# Patient Record
Sex: MALE | Race: WHITE | Employment: OTHER | URBAN - METROPOLITAN AREA
[De-identification: names, ages, dates, MRNs, and addresses within clinical notes are randomized per-mention and may not be internally consistent; named-entity substitution may affect disease eponyms.]

---

## 2017-02-11 ENCOUNTER — OFFICE VISIT (OUTPATIENT)
Dept: FAMILY MEDICINE CLINIC | Age: 32
End: 2017-02-11

## 2017-02-11 VITALS
WEIGHT: 216 LBS | TEMPERATURE: 97 F | DIASTOLIC BLOOD PRESSURE: 80 MMHG | HEIGHT: 69 IN | RESPIRATION RATE: 16 BRPM | SYSTOLIC BLOOD PRESSURE: 128 MMHG | HEART RATE: 60 BPM | BODY MASS INDEX: 31.99 KG/M2

## 2017-02-11 DIAGNOSIS — J01.00 ACUTE NON-RECURRENT MAXILLARY SINUSITIS: ICD-10-CM

## 2017-02-11 DIAGNOSIS — R52 BODY ACHES: Primary | ICD-10-CM

## 2017-02-11 DIAGNOSIS — B34.9 VIRAL SYNDROME: ICD-10-CM

## 2017-02-11 LAB
INFLUENZA A ANTIBODY: NEGATIVE
INFLUENZA B ANTIBODY: NEGATIVE

## 2017-02-11 PROCEDURE — 87804 INFLUENZA ASSAY W/OPTIC: CPT | Performed by: FAMILY MEDICINE

## 2017-02-11 PROCEDURE — 99213 OFFICE O/P EST LOW 20 MIN: CPT | Performed by: FAMILY MEDICINE

## 2017-02-11 RX ORDER — AZITHROMYCIN 250 MG/1
TABLET, FILM COATED ORAL
Qty: 1 PACKET | Refills: 0 | Status: SHIPPED | OUTPATIENT
Start: 2017-02-11 | End: 2017-02-21

## 2017-02-11 ASSESSMENT — ENCOUNTER SYMPTOMS
TROUBLE SWALLOWING: 0
SORE THROAT: 1
SINUS PAIN: 1
COUGH: 1
DIARRHEA: 1
RHINORRHEA: 1
SHORTNESS OF BREATH: 0
SINUS PRESSURE: 1
ABDOMINAL PAIN: 1
NAUSEA: 0
EYES NEGATIVE: 1
VOICE CHANGE: 1
CONSTIPATION: 0
VOMITING: 1
WHEEZING: 0

## 2017-06-02 ENCOUNTER — TELEPHONE (OUTPATIENT)
Dept: FAMILY MEDICINE CLINIC | Age: 32
End: 2017-06-02

## 2017-06-02 RX ORDER — PROPRANOLOL HYDROCHLORIDE 80 MG/1
CAPSULE, EXTENDED RELEASE ORAL
Qty: 30 CAPSULE | Refills: 5 | Status: SHIPPED | OUTPATIENT
Start: 2017-06-02 | End: 2018-05-07 | Stop reason: SDUPTHER

## 2018-05-07 RX ORDER — PROPRANOLOL HYDROCHLORIDE 80 MG/1
CAPSULE, EXTENDED RELEASE ORAL
Qty: 30 CAPSULE | Refills: 5 | Status: SHIPPED | OUTPATIENT
Start: 2018-05-07

## 2018-07-16 ENCOUNTER — OFFICE VISIT (OUTPATIENT)
Dept: FAMILY MEDICINE CLINIC | Age: 33
End: 2018-07-16
Payer: COMMERCIAL

## 2018-07-16 VITALS
SYSTOLIC BLOOD PRESSURE: 118 MMHG | TEMPERATURE: 97.5 F | DIASTOLIC BLOOD PRESSURE: 72 MMHG | BODY MASS INDEX: 30.81 KG/M2 | HEART RATE: 56 BPM | HEIGHT: 69 IN | WEIGHT: 208 LBS | RESPIRATION RATE: 20 BRPM

## 2018-07-16 DIAGNOSIS — M25.462 KNEE EFFUSION, LEFT: ICD-10-CM

## 2018-07-16 DIAGNOSIS — R26.89 LIMPING: ICD-10-CM

## 2018-07-16 DIAGNOSIS — S83.412A SPRAIN OF MEDIAL COLLATERAL LIGAMENT OF LEFT KNEE, INITIAL ENCOUNTER: ICD-10-CM

## 2018-07-16 DIAGNOSIS — M25.562 LEFT KNEE PAIN, UNSPECIFIED CHRONICITY: Primary | ICD-10-CM

## 2018-07-16 PROCEDURE — G8417 CALC BMI ABV UP PARAM F/U: HCPCS | Performed by: FAMILY MEDICINE

## 2018-07-16 PROCEDURE — 1036F TOBACCO NON-USER: CPT | Performed by: FAMILY MEDICINE

## 2018-07-16 PROCEDURE — G8427 DOCREV CUR MEDS BY ELIG CLIN: HCPCS | Performed by: FAMILY MEDICINE

## 2018-07-16 PROCEDURE — 99213 OFFICE O/P EST LOW 20 MIN: CPT | Performed by: FAMILY MEDICINE

## 2018-07-16 RX ORDER — PREDNISONE 10 MG/1
TABLET ORAL
Qty: 51 TABLET | Refills: 0 | Status: SHIPPED | OUTPATIENT
Start: 2018-07-16 | End: 2018-07-26

## 2018-07-16 RX ORDER — HYDROCODONE BITARTRATE AND ACETAMINOPHEN 7.5; 325 MG/1; MG/1
1 TABLET ORAL EVERY 6 HOURS PRN
Qty: 28 TABLET | Refills: 0 | Status: SHIPPED | OUTPATIENT
Start: 2018-07-16 | End: 2018-07-23

## 2018-07-16 ASSESSMENT — ENCOUNTER SYMPTOMS
SINUS PRESSURE: 0
SORE THROAT: 0
COUGH: 0
DIARRHEA: 0
SHORTNESS OF BREATH: 0
EYE DISCHARGE: 0
EYE ITCHING: 0
CONSTIPATION: 0
ABDOMINAL PAIN: 0

## 2018-07-16 ASSESSMENT — PATIENT HEALTH QUESTIONNAIRE - PHQ9
1. LITTLE INTEREST OR PLEASURE IN DOING THINGS: 0
SUM OF ALL RESPONSES TO PHQ9 QUESTIONS 1 & 2: 0
SUM OF ALL RESPONSES TO PHQ QUESTIONS 1-9: 0
2. FEELING DOWN, DEPRESSED OR HOPELESS: 0

## 2018-07-16 NOTE — PROGRESS NOTES
is unable to fully flex and extend it is exquisite tenderness medially in the left knee also over the patellar tendon is swelling in the posterior part of the knee also. Increased pain with full flexion and extension bears weight and limps. Bent down and standing back up felt a pop and sudden severe pain in the posterior medial portion of his left knee. Will do an adequate exam due to the swelling and intermittent pain he needs an MRI to rule out internal derangement. Assessment & Plan    Diagnosis Orders   1. Left knee pain, unspecified chronicity  XR KNEE LEFT (1-2 VIEWS)    MRI LOWER EXTREMITY LEFT W JT WO CONTRAST    HYDROcodone-acetaminophen (NORCO) 7.5-325 MG per tablet   2. Knee effusion, left  MRI LOWER EXTREMITY LEFT W JT WO CONTRAST    HYDROcodone-acetaminophen (NORCO) 7.5-325 MG per tablet   3. Sprain of medial collateral ligament of left knee, initial encounter  MRI LOWER EXTREMITY LEFT W JT WO CONTRAST    HYDROcodone-acetaminophen (NORCO) 7.5-325 MG per tablet   4. Limping  MRI LOWER EXTREMITY LEFT W JT WO CONTRAST     Orders Placed This Encounter   Procedures    XR KNEE LEFT (1-2 VIEWS)     Standing Status:   Future     Number of Occurrences:   1     Standing Expiration Date:   7/16/2019     Order Specific Question:   Reason for exam:     Answer:   pain and pt heard a pop    MRI LOWER EXTREMITY LEFT W JT WO CONTRAST     Standing Status:   Future     Standing Expiration Date:   7/16/2019     Order Specific Question:   Laterality     Answer:   Left     Orders Placed This Encounter   Medications    predniSONE (DELTASONE) 10 MG tablet     Sig: Take 6 tabs x 6 days, then 5 x 1, 4 x 1, 3 x 1, 2 x 1, 1 x 1     Dispense:  51 tablet     Refill:  0    HYDROcodone-acetaminophen (NORCO) 7.5-325 MG per tablet     Sig: Take 1 tablet by mouth every 6 hours as needed for Pain for up to 7 days. .     Dispense:  28 tablet     Refill:  0     There are no discontinued medications.   No Follow-up on

## 2018-07-25 ENCOUNTER — TELEPHONE (OUTPATIENT)
Dept: FAMILY MEDICINE CLINIC | Age: 33
End: 2018-07-25

## 2018-07-25 NOTE — TELEPHONE ENCOUNTER
Received a call from Alexander in regards to MRI Knee, it requires a Peer to Peer request. Do you want to do the Peer to Peer? If so we need to call [phone] # 3 reference # [id number]    Please advise.

## 2018-07-31 ENCOUNTER — TELEPHONE (OUTPATIENT)
Dept: FAMILY MEDICINE CLINIC | Age: 33
End: 2018-07-31

## 2018-07-31 DIAGNOSIS — M25.562 ACUTE PAIN OF LEFT KNEE: Primary | ICD-10-CM

## 2018-07-31 NOTE — TELEPHONE ENCOUNTER
Pt's wife, Shahida Grey is calling in regards to Chaitanya's MRI that was denied.  She was told that you were going to talk to someone at the insurance company on Monday (see telephone message from 7/25/18)  She is inquiring about the status of the MRI  Please advise  Thanks

## 2018-08-09 ENCOUNTER — TELEPHONE (OUTPATIENT)
Dept: FAMILY MEDICINE CLINIC | Age: 33
End: 2018-08-09

## 2018-08-09 ENCOUNTER — HOSPITAL ENCOUNTER (OUTPATIENT)
Dept: PHYSICAL THERAPY | Age: 33
Setting detail: THERAPIES SERIES
Discharge: HOME | End: 2018-08-09
Payer: COMMERCIAL

## 2018-08-09 PROCEDURE — 97162 PT EVAL MOD COMPLEX 30 MIN: CPT

## 2018-08-09 PROCEDURE — G0283 ELEC STIM OTHER THAN WOUND: HCPCS

## 2018-08-09 ASSESSMENT — PAIN SCALES - GENERAL: PAINLEVEL_OUTOF10: 6

## 2018-08-09 ASSESSMENT — PAIN DESCRIPTION - ORIENTATION: ORIENTATION: RIGHT;INNER

## 2018-08-09 ASSESSMENT — PAIN DESCRIPTION - PAIN TYPE: TYPE: ACUTE PAIN

## 2018-08-09 ASSESSMENT — PAIN DESCRIPTION - LOCATION: LOCATION: KNEE

## 2018-08-09 ASSESSMENT — PAIN DESCRIPTION - DESCRIPTORS: DESCRIPTORS: SHARP;THROBBING

## 2018-08-09 ASSESSMENT — PAIN DESCRIPTION - FREQUENCY: FREQUENCY: CONTINUOUS

## 2018-08-09 NOTE — PROGRESS NOTES
Yulia Cooley, PT on 8/9/2018 at 3:52 PM    PT Individual Minutes  Time In: 1304  Time Out: 1400  Minutes: 56  Timed Code Treatment Minutes: 5 Minutes (41 eval, 10 estim )  Procedure Minutes: 46    C[place]or Fall Risk Assessment  Risk Factor Scale  Score   History of Falls [] Yes  [x] No 25  0 0   Secondary Diagnosis [] Yes  [x] No 15  0 0   Ambulatory Aid [] Furniture  [] Crutches/cane/walker  [x] None/bedrest/wheelchair/nurse 30  15  0 0   IV/Heparin Lock [] Yes  [x] No 20  0 0   Gait/Transferring [x] Impaired  [] Weak  [] Normal/bedrest/immobile 20  10  0 20   Mental Status [] Forgets limitations  [x] Oriented to own ability 15  0 0      Total: 20     Based on the Assessment score: check the appropriate box.   [x]  No intervention needed   Low =   Score of 0-24  []  Use standard prevention interventions Moderate =  Score of 24-44   [] Discuss fall prevention strategies   [] Indicate moderate falls risk on eval  []  Use high risk prevention interventions High = Score of 45 and higher   [] Discuss fall prevention strategies   [] Provide supervision during treatment time

## 2018-08-09 NOTE — PLAN OF CARE
[address] [facility]   Shanna Eller [address] [place], [place]  Phone:  [phone]   Fax:  [phone]    [] Certification  [] Recertification [x]  Plan of Care  [] Progress Note   [] Discharge        To:  Referring Practitioner: Dr. Tevin Womack  From:  Aleksandra Choi, PT  Patient: Wood Flores          : 1985  Diagnosis: acute pain Lt knee          Date: 2018  Treatment Diagnosis: Lt LE weakness, decreased Lt LE ROM, difficulty with gait, Lt knee pain      Total # of Visits to Date: 1   No Show: 0    Canceled Appointment: 0     OBJECTIVE:   Short Term Goals - Time Frame for Short term goals: 2 wks     Goals Current/Discharge status  Met   Short term goal 1: Independent with HEP   Need for HEP  [] yes  [] no   Short term goal 2: Report 25% reduction in symptoms with improved ambulation   C/o 6-10+/10 pain with all WB activities [] yes  [] no     Long Term Goals - Time Frame for Long term goals : 6 wks   Goals Current/ Discharge status Met   Long term goal 1: Improve LE strength >/= 4+/5 to return to work duties without modifications  Strength RLE  Strength RLE: Exception  Strength LLE  Strength LLE: Exception  Comment: all with painful give, pt reports \"it feels like something is pulling apart in my knee\"   L Hip Flexion: 4/5  L Knee Flexion: 4/5  L Knee Extension: 3+/5  L Ankle Dorsiflexion: 5/5    [] yes  [] no   Long term goal 2: Improve LE ROM 0-120 to allow improved ease with stairs with reciprocal pattern  PROM RLE (degrees)  RLE PROM: Exceptions  R SLR: 80  AROM RLE (degrees)  RLE AROM: Exceptions  R Knee Flexion 0-145: 0-118  PROM LLE (degrees)  LLE PROM: Exceptions  L SLR: 42  AROM LLE (degrees)  LLE AROM : Exceptions  L Knee Flexion 0-145: 4-90    [] yes  [] no   Long term goal 3: Decrease edema Lt knee < 1cm difference from Rt  3 cm difference mid patella girth of knee  [] yes  [] no   Long term goal 4: LEFS </= 40/80 to demonstrate improved overall activity tolerance  LEFS: 27/80 [] yes  [] no   Long term goal 5: Ambulate without AD with improved Lt WB and heel strike  Pt ambulates with sig decreased Lt WB, fwd flexed posture, decreased trunk rotation and arm swing without AD;  pt ambulates with single crutch with improved symmetry and decreased antalgia, VCs for sequencing and to utilize single crutch in Rt UE  [] yes  [] no     Body structures, Functions, Activity limitations: Decreased functional mobility , Decreased ROM, Decreased strength, Decreased endurance, Decreased balance, Decreased coordination  Assessment: Pt presents with sig pain and limitations Lt knee after sustaining injury getting up from floor at work. pt reports feeling \"popping\" sensation medial knee followed by burning, pain, and increased edema. Pt with decreased ROM with high guard. Decreased strength with painful give. Difficulty tolerating special tests and obtaining accurate assessment due to increased pain. Increased pain with medial McMurrays, valgus stress. No sig laxity with anterior/ posterior drawer, but high guarding. Increased pain with anterior drawer. Pt with sig gait deviations with minimal Lt WB. Advised pt to utilize single crutch in Rt UE to improve symmetry and prevent further injury. Recommend further testing due to severity of symptoms and limitations. Skilled PT with caution to prevent further injury. Responded well to modalities. Initiated isometrics. Will avoid aggressive ROM until extent of injury known. Please advise.     Prognosis: Good  New Education Provided: HEP, POC   G-Codes   NA    PLAN: [x] Evaluate and Treat  Frequency/Duration:  Plan  Times per week: 2  Plan weeks: 4-6  Current Treatment Recommendations: Strengthening, ROM, Balance Training, Functional Mobility Training, Gait Training, Stair training, Neuromuscular Re-education, Manual Therapy - Soft Tissue Mobilization, Manual Therapy - Joint Manipulation, Safety Education & Training,

## 2018-08-10 NOTE — TELEPHONE ENCOUNTER
Told the patient this would probably happen.   He has to try physical therapy unless he is already tried and failed there is no active doing appeal if he attempts physical therapy and it is too painful that he's failed and then we can appeal.  We will also need something from the physical therapist stating he can't continue with the therapy

## 2018-08-13 ENCOUNTER — HOSPITAL ENCOUNTER (OUTPATIENT)
Dept: PHYSICAL THERAPY | Age: 33
Setting detail: THERAPIES SERIES
Discharge: HOME | End: 2018-08-13
Payer: COMMERCIAL

## 2018-08-13 ENCOUNTER — APPOINTMENT (OUTPATIENT)
Dept: PHYSICAL THERAPY | Age: 33
End: 2018-08-13
Payer: COMMERCIAL

## 2018-08-13 PROCEDURE — 97140 MANUAL THERAPY 1/> REGIONS: CPT

## 2018-08-13 PROCEDURE — G0283 ELEC STIM OTHER THAN WOUND: HCPCS

## 2018-08-13 PROCEDURE — 97110 THERAPEUTIC EXERCISES: CPT

## 2018-08-13 ASSESSMENT — PAIN SCALES - GENERAL: PAINLEVEL_OUTOF10: 7

## 2018-08-13 ASSESSMENT — PAIN DESCRIPTION - DESCRIPTORS: DESCRIPTORS: SHARP;THROBBING

## 2018-08-13 ASSESSMENT — PAIN DESCRIPTION - LOCATION: LOCATION: KNEE

## 2018-08-13 NOTE — PROGRESS NOTES
[facility]   Outpatient Physical Therapy   Treatment Note  [] [address]  [x] [facility]            of [address]  Date: 2018  Patient: Jocy Marcos  : 1985  ACCT #: [de-identified]  Referring Practitioner: Dr. Zuri Lamb   Diagnosis: acute pain Lt knee     Visit Information:  PT Visit Information  Onset Date:  (3-4 wks )  PT Insurance Information: [employer]   Total # of Visits Approved: 30  Total # of Visits to Date: 2  No Show: 0  Canceled Appointment: 0  Progress Note Counter:      SUBJECTIVE:   Subjective  Subjective: Pt reports returned to work today x6 hrs with sig increased pain. Reports able to tolerate work initially with crutch taking weight off of leg and then increased fatigue at end of shift with decreased ability to compensate for Lt knee. Appeal to insurance company was denied. Advised will need 4 weeks of PT prior to authorization for MRI.     HEP Compliance:  [x] Good [] Fair [] Poor [] Reports not doing due to:    PAIN   Location:   Pain Location: Knee  Pain Rating (0-10 pain scale):  Pain Level: 7  Pain Description:  Pain Descriptors: [place] Rakes, Throbbing  Action:  [x] Acceptable for treatment  []  Other:    OBJECTIVE:   Exercises  Exercise 1: QS 5s/10 with improved quad muscle recruitment with mild fasiculations   Exercise 2: Initiated seated open chain: hip flex, LAQ, AP, GS x10 to improve strength   Exercise 4: Initiated Nu step L1 x4 minutes with c/o \"pulling\", advised pt to decrease range   Exercise 7: supine heel slides x10 to improve ROM   Exercise 20: HEP: open chain exercises     Manual: []  NA   Manual therapy  Joint mobilization: gentle patellar mobs with decreased tolerance to touch and mobility   PROM: gentle knee ext/ flex to pt tolerance     Modalities: []  NA  Modalities  Cryotherapy (Minutes\Location): supine with IFC   E-stim (parameters): IFC x10' supine to decrease pain, spasm, and inflammation Continue POC to pt tolerance  [] Hold PT for ___ days.   See note to physician  [] Discharge PT    Signature:   Electronically signed by Nena Cerrato PT on 8/13/18 at 2:37 PM    PT Individual Minutes  Time In: 3518  Time Out: 9384  Minutes: 48  Timed Code Treatment Minutes: 38 Minutes (ther ex x30, 8 min manual )

## 2018-08-15 ENCOUNTER — HOSPITAL ENCOUNTER (OUTPATIENT)
Dept: PHYSICAL THERAPY | Age: 33
Setting detail: THERAPIES SERIES
Discharge: HOME | End: 2018-08-15
Payer: COMMERCIAL

## 2018-08-15 PROCEDURE — 97140 MANUAL THERAPY 1/> REGIONS: CPT

## 2018-08-15 PROCEDURE — G0283 ELEC STIM OTHER THAN WOUND: HCPCS

## 2018-08-15 PROCEDURE — 97110 THERAPEUTIC EXERCISES: CPT

## 2018-08-15 ASSESSMENT — PAIN DESCRIPTION - DESCRIPTORS: DESCRIPTORS: THROBBING;SHARP

## 2018-08-15 ASSESSMENT — PAIN DESCRIPTION - ORIENTATION: ORIENTATION: LEFT

## 2018-08-15 ASSESSMENT — PAIN DESCRIPTION - LOCATION: LOCATION: KNEE

## 2018-08-15 ASSESSMENT — PAIN SCALES - GENERAL: PAINLEVEL_OUTOF10: 5

## 2018-08-15 NOTE — PROGRESS NOTES
[facility]   Outpatient Physical Therapy   Treatment Note  [] [address]  [x] [facility]            of [address]  Date: 8/15/2018  Patient: Bety Burn  : 1985  ACCT #: [de-identified]  Referring Practitioner: Dr. Marcella Burnett   Diagnosis: acute pain Lt knee     Visit Information:  PT Visit Information  Onset Date:  (3-4 wks )  PT Insurance Information: [employer]   Total # of Visits Approved: 30  Total # of Visits to Date: 3  No Show: 0  Canceled Appointment: 0  Progress Note Counter: 3/12    SUBJECTIVE:   Subjective  Subjective: Reports went to work this morning for about 4 hours. Reports longer day yesterday with increase swelling    HEP Compliance:  [x] Good [] Fair [] Poor [] Reports not doing due to:    PAIN   Location:   Pain Location: Knee  Pain Rating (0-10 pain scale):  Pain Level: 5  Pain Description:  Pain Descriptors: Throbbing, Sharp  Action:  [x] Acceptable for treatment  []  Other:    OBJECTIVE:   Exercises  Exercise 1: QS 5S/10  Exercise 3: initiated SLR x 10 for strengthening  Exercise 4: NuStep for strengthening and ROM L2x8 min  Exercise 7: supine heel slides x10 to improve ROM   Exercise 8: initiated GS/AP for strengthening 10x  Exercise 20: HEP: cont current    Manual: []  NA   Manual therapy  Joint mobilization: gentle patella mobs   Soft Tissue Mobalization: STM/light touch therapy then to STM to increase tolerance to touch    Modalities: []  NA  Modalities  E-stim (parameters): IFC/CP X 15min supine     Mobility: []  NA        Transfers  Sit to Stand: Independent  Stand to sit:  Independent  Ambulation 1  Surface: carpet  Device: Axillary Crutches (single )  Assistance: Independent  Quality of Gait: right lean over AD, decrease rt knee extension with stance with early release post left advancement, NBOS, antalgia  Distance: unlimited within clinic              HEP  Continue with current Home Exercise Program.  See Objective

## 2018-08-20 ENCOUNTER — HOSPITAL ENCOUNTER (OUTPATIENT)
Dept: PHYSICAL THERAPY | Age: 33
Setting detail: THERAPIES SERIES
Discharge: HOME | End: 2018-08-20
Payer: COMMERCIAL

## 2018-08-20 PROCEDURE — 97116 GAIT TRAINING THERAPY: CPT

## 2018-08-20 PROCEDURE — 97110 THERAPEUTIC EXERCISES: CPT

## 2018-08-20 PROCEDURE — G0283 ELEC STIM OTHER THAN WOUND: HCPCS

## 2018-08-20 ASSESSMENT — PAIN DESCRIPTION - DESCRIPTORS: DESCRIPTORS: SHARP;THROBBING

## 2018-08-20 ASSESSMENT — PAIN SCALES - GENERAL: PAINLEVEL_OUTOF10: 7

## 2018-08-20 ASSESSMENT — PAIN DESCRIPTION - LOCATION: LOCATION: KNEE

## 2018-08-20 NOTE — PROGRESS NOTES
posture, improved symmetry with crutch vs without AD             ROM: [] NT           AROM LLE (degrees)  L Knee Flexion 0-145: 106     HEP  Continue with current Home Exercise Program.  See Objective section for progression of HEP. Comments:       POST-PAIN    Pain Rating (0-10 pain scale): 5  Location and Pain Description same as pre-pain unless otherwise indicated. Action: [] NA  [] Call Physician  [x] Perform HEP  [x] Meds as prescribed     ASSESSMENT:     Conditions Requiring Skilled Therapeutic Intervention  Body structures, Functions, Activity limitations: Decreased functional mobility , Decreased ROM, Decreased strength, Decreased endurance, Decreased balance, Decreased coordination  Assessment: Pt reports feels he is able to flex knee more since onset of PT. Little change with improved tolerance to WB activities per pt report. Noted slight change with edema measuring 42.5 cm mid patella. Noted improved knee flexion ROM. Continued gait deviations without AD. Increased time on Nu step with good tolerance.        Tolerance to treatment:  [x] Good   [] Fair   [] Poor  [] Fatigued   [] Increased pain   Limited by:    Goals:     Short term goals  Time Frame for Short term goals: 2 wks   Short term goal 1: Independent with HEP   Short term goal 2: Report 25% reduction in symptoms with improved ambulation   Long term goals  Time Frame for Long term goals : 6 wks   Long term goal 1: Improve LE strength >/= 4+/5 to return to work duties without modifications   Long term goal 2: Improve LE ROM 0-120 to allow improved ease with stairs with reciprocal pattern   Long term goal 3: Decrease edema Lt knee < 1cm difference from Rt   Long term goal 4: LEFS </= 40/80 to demonstrate improved overall activity tolerance   Long term goal 5: Ambulate without AD with improved Lt WB and heel strike     Progress toward goals: LTG 2, LTG 3  Goals Met:    []  See updated POC   Comments:    PLAN:  [x] Continue POC to pt tolerance  [] Hold PT for ___ days.   See note to physician  [] Discharge PT    Signature:   Electronically signed by Francisco Cespedes PT on 8/20/18 at 4:28 PM    PT Individual Minutes  Time In: 9413  Time Out: 3609  Minutes: 52  Timed Code Treatment Minutes: 42 Minutes

## 2018-08-22 ENCOUNTER — HOSPITAL ENCOUNTER (OUTPATIENT)
Dept: PHYSICAL THERAPY | Age: 33
Setting detail: THERAPIES SERIES
Discharge: HOME | End: 2018-08-22
Payer: COMMERCIAL

## 2018-08-22 PROCEDURE — 97110 THERAPEUTIC EXERCISES: CPT

## 2018-08-22 PROCEDURE — G0283 ELEC STIM OTHER THAN WOUND: HCPCS

## 2018-08-22 ASSESSMENT — PAIN SCALES - GENERAL: PAINLEVEL_OUTOF10: 4

## 2018-08-22 ASSESSMENT — PAIN DESCRIPTION - LOCATION: LOCATION: KNEE

## 2018-08-22 ASSESSMENT — PAIN DESCRIPTION - DESCRIPTORS: DESCRIPTORS: ACHING

## 2018-08-22 ASSESSMENT — PAIN DESCRIPTION - ORIENTATION: ORIENTATION: LEFT

## 2018-08-28 ENCOUNTER — HOSPITAL ENCOUNTER (OUTPATIENT)
Dept: PHYSICAL THERAPY | Age: 33
Setting detail: THERAPIES SERIES
Discharge: HOME | End: 2018-08-28
Payer: COMMERCIAL

## 2018-08-28 PROCEDURE — G0283 ELEC STIM OTHER THAN WOUND: HCPCS

## 2018-08-28 PROCEDURE — 97110 THERAPEUTIC EXERCISES: CPT

## 2018-08-28 ASSESSMENT — PAIN DESCRIPTION - ORIENTATION: ORIENTATION: LEFT

## 2018-08-28 ASSESSMENT — PAIN DESCRIPTION - LOCATION: LOCATION: KNEE

## 2018-08-28 ASSESSMENT — PAIN DESCRIPTION - DESCRIPTORS: DESCRIPTORS: ACHING

## 2018-08-28 ASSESSMENT — PAIN SCALES - GENERAL: PAINLEVEL_OUTOF10: 6

## 2018-08-28 NOTE — PROGRESS NOTES
[facility]   Outpatient Physical Therapy   Treatment Note  [] [address]  [x] [facility]  Date: 2018  Patient: Rahul Albert  : 1985  ACCT #: [de-identified]  Referring Practitioner: Dr. Maikel Rocha   Diagnosis: acute pain Lt knee      Visit Information:  PT Visit Information  Onset Date:  (3-4 wks )  [facility]   Total # of Visits Approved: 30  Total # of Visits to Date: 6  No Show: 0  Canceled Appointment: 0  Progress Note Counter:     SUBJECTIVE:   Subjective  Subjective: Pt reports compliance with HEP. HEP Compliance:  [x] Good [] Fair [] Poor [] Reports not doing due to:    PAIN   Location:   Pain Location: Knee  Pain Rating (0-10 pain scale):  Pain Level: 6  Pain Description:  Pain Descriptors: Aching  Action:  [x] Acceptable for treatment  []  Other:    OBJECTIVE:   Exercises  Exercise 1: QS 5S/15  Exercise 3: SLR x 15  Exercise 4: NuStep for strengthening and ROM L3 x 10 min  Exercise 6: supine PBall: bridges,  LTR, DKTC x10 for strengthening and flexibility with decreased pain   Exercise 7: seated heel slides 5s/10 to improve ROM   Exercise 8: GS/AP 15x    Manual: []  NA   Manual therapy  Joint mobilization: gentle patella mobs     Modalities: []  NA  Modalities  E-stim (parameters): IFC/CP x 15min to decrease pain and inflammation     Mobility: [x]  NA    Strength: [x] NT    ROM: [] NT  AROM LLE (degrees)  L Knee Flexion 0-145: 7-132 supine    HEP  Continue with current Home Exercise Program.  See Objective section for progression of HEP. Comments:       POST-PAIN    Pain Rating (0-10 pain scale): 3/10  Location and Pain Description same as pre-pain unless otherwise indicated.   Action: [] NA  [] Call Physician  [x] Perform HEP  [] Meds as prescribed     ASSESSMENT:     Conditions Requiring Skilled Therapeutic Intervention  Assessment: Continued light touch sensitivity with gentle patellar mobs. No reports of inc pain with therex this date. Tolerance to treatment:  [x] Good   [] Fair   [] Poor  [] Fatigued   [] Increased pain   Limited by:    Goals:     Short term goals  Time Frame for Short term goals: 2 wks   Short term goal 1: Independent with HEP   Short term goal 2: Report 25% reduction in symptoms with improved ambulation   Long term goals  Time Frame for Long term goals : 6 wks   Long term goal 1: Improve LE strength >/= 4+/5 to return to work duties without modifications   Long term goal 2: Improve LE ROM 0-120 to allow improved ease with stairs with reciprocal pattern   Long term goal 3: Decrease edema Lt knee < 1cm difference from Rt   Long term goal 4: LEFS </= 40/80 to demonstrate improved overall activity tolerance   Long term goal 5: Ambulate without AD with improved Lt WB and heel strike     Progress toward goals: Therex and modalities to decrease muscle tension and improve LE strength and ROM for improved tolerance to functional activities  Goals Met:    []  See updated POC   Comments:    PLAN:  [x] Continue POC to pt tolerance  [] Hold PT for ___ days.   See note to physician  [] Discharge PT    Signature:   Electronically signed by Jazmin Schilling PTA on 8/28/18 at 2:04 PM    PT Individual Minutes  Time In: 3338  Time Out: 8530  Minutes: 55  Timed Code Treatment Minutes: 40 Minutes          Activity Minutes Units   Ther Ex 40 3   Manual      Neuro Ed     Estim/CP 15 1

## 2018-08-30 ENCOUNTER — HOSPITAL ENCOUNTER (OUTPATIENT)
Dept: PHYSICAL THERAPY | Age: 33
Setting detail: THERAPIES SERIES
Discharge: HOME | End: 2018-08-30
Payer: COMMERCIAL

## 2018-08-30 PROCEDURE — 97116 GAIT TRAINING THERAPY: CPT

## 2018-08-30 PROCEDURE — G0283 ELEC STIM OTHER THAN WOUND: HCPCS

## 2018-08-30 PROCEDURE — 97110 THERAPEUTIC EXERCISES: CPT

## 2018-08-30 ASSESSMENT — PAIN DESCRIPTION - LOCATION: LOCATION: KNEE

## 2018-08-30 ASSESSMENT — PAIN SCALES - GENERAL: PAINLEVEL_OUTOF10: 6

## 2018-08-30 NOTE — PLAN OF CARE
continued decreased Lt stance and increased antalgia  [] yes  [x] no     Body structures, Functions, Activity limitations: Decreased functional mobility , Decreased ROM, Decreased strength, Decreased endurance, Decreased balance, Decreased coordination  Assessment: Pt with continued edema Lt knee measuring 43cm mid-patella vs 40 on Rt. Pt with continued gait deviations. Pt c/o \"catch\" with motion from flexion to extension. Pt with continued sig increased pain with Lt stance. Noted mild improvements with strength, good improvements with ROM. Minimal changes noted with pain. Pt would benefit from continued skilled PT to maximize function and return to previous activity with minimal pain. New Education Provided:   HEP  G-Codes   NA    PLAN: [x] Evaluate and Treat  Frequency/Duration:  Plan  Times per week: 2  Plan weeks: 3-4   Current Treatment Recommendations: Strengthening, ROM, Balance Training, Functional Mobility Training, Gait Training, Stair training, Neuromuscular Re-education, Manual Therapy - Soft Tissue Mobilization, Manual Therapy - Joint Manipulation, Safety Education & Training, Home Exercise Program, Patient/Caregiver Education & Training, Equipment Evaluation, Education, & procurement, Modalities  Plan Comment: pending test results, will proceed cautiously      Precautions:        ?     Patient Status:[x] Continue/ Initate plan of Care    [] Discharge PT. Recommend pt continue with HEP. [] Additional visits requested, Please re-certify for additional visits:        Signature: Electronically signed by Tony Best PT on 8/30/18 at 2:15 PM    If you have any questions or concerns, please don't hesitate to call. Thank you for your referral.    I have reviewed this plan of care and certify a need for medically necessary rehabilitation services.     Physician Signature:__________________________________________________________  Date:  Please sign and return

## 2018-09-04 ENCOUNTER — HOSPITAL ENCOUNTER (OUTPATIENT)
Dept: PHYSICAL THERAPY | Age: 33
Setting detail: THERAPIES SERIES
Discharge: HOME | End: 2018-09-04
Payer: COMMERCIAL

## 2018-09-04 PROCEDURE — 97110 THERAPEUTIC EXERCISES: CPT

## 2018-09-04 PROCEDURE — 97032 APPL MODALITY 1+ESTIM EA 15: CPT

## 2018-09-04 ASSESSMENT — PAIN DESCRIPTION - LOCATION: LOCATION: KNEE

## 2018-09-04 ASSESSMENT — PAIN DESCRIPTION - FREQUENCY: FREQUENCY: CONTINUOUS

## 2018-09-04 ASSESSMENT — PAIN DESCRIPTION - ORIENTATION: ORIENTATION: LEFT

## 2018-09-04 ASSESSMENT — PAIN SCALES - GENERAL: PAINLEVEL_OUTOF10: 7

## 2018-09-04 ASSESSMENT — PAIN DESCRIPTION - PAIN TYPE: TYPE: ACUTE PAIN

## 2018-09-04 ASSESSMENT — PAIN DESCRIPTION - DESCRIPTORS: DESCRIPTORS: ACHING

## 2018-09-06 ENCOUNTER — HOSPITAL ENCOUNTER (OUTPATIENT)
Dept: PHYSICAL THERAPY | Age: 33
Setting detail: THERAPIES SERIES
Discharge: HOME | End: 2018-09-06
Payer: COMMERCIAL

## 2018-09-06 PROCEDURE — G0283 ELEC STIM OTHER THAN WOUND: HCPCS

## 2018-09-06 PROCEDURE — 97140 MANUAL THERAPY 1/> REGIONS: CPT

## 2018-09-06 PROCEDURE — 97110 THERAPEUTIC EXERCISES: CPT

## 2018-09-06 ASSESSMENT — PAIN DESCRIPTION - LOCATION: LOCATION: KNEE

## 2018-09-06 ASSESSMENT — PAIN DESCRIPTION - DESCRIPTORS: DESCRIPTORS: CONSTANT;SHARP;THROBBING

## 2018-09-06 ASSESSMENT — PAIN SCALES - GENERAL: PAINLEVEL_OUTOF10: 8

## 2018-09-06 NOTE — PROGRESS NOTES
ASSESSMENT:     Conditions Requiring Skilled Therapeutic Intervention  Body structures, Functions, Activity limitations: Decreased functional mobility , Decreased ROM, Decreased strength, Decreased endurance, Decreased balance, Decreased coordination  Assessment: Patient with decreased tolerance to ther ex and manual this date due to fall last night. Pt with follow up with physician 9/7 and hoping for further testing to determine nature of injury. Will continue per POC pending f/u with PCP. Treatment Diagnosis: Lt LE weakness, decreased Lt LE ROM, difficulty with gait, Lt knee pain      Tolerance to treatment:  [] Good   [x] Fair   [] Poor  [] Fatigued   [x] Increased pain   Limited by:    Goals:     Short term goals  Time Frame for Short term goals: 2 wks   Short term goal 1: Independent with HEP   Short term goal 2: Report 25% reduction in symptoms with improved ambulation   Long term goals  Time Frame for Long term goals : 6 wks   Long term goal 1: Improve LE strength >/= 4+/5 to return to work duties without modifications   Long term goal 2: Improve LE ROM 0-120 to allow improved ease with stairs with reciprocal pattern   Long term goal 3: Decrease edema Lt knee < 1cm difference from Rt   Long term goal 4: LEFS </= 40/80 to demonstrate improved overall activity tolerance   Long term goal 5: Ambulate without AD with improved Lt WB and heel strike     Progress toward goals: limited this date due to increased pain   Goals Met:    []  See updated POC   Comments:    PLAN:  [x] Continue POC to pt tolerance  [] Hold PT for ___ days.   See note to physician  [] Discharge PT    Signature:   Electronically signed by Chloe Burns PT on 9/6/18 at 1:27 PM    PT Individual Minutes  Time In: 4296  Time Out: 2748  Minutes: 41  Timed Code Treatment Minutes: 31 Minutes (ther ex, manual)  10 min IFC

## 2018-09-07 ENCOUNTER — OFFICE VISIT (OUTPATIENT)
Dept: FAMILY MEDICINE CLINIC | Age: 33
End: 2018-09-07
Payer: COMMERCIAL

## 2018-09-07 VITALS
SYSTOLIC BLOOD PRESSURE: 108 MMHG | HEART RATE: 54 BPM | HEIGHT: 69 IN | BODY MASS INDEX: 31.1 KG/M2 | DIASTOLIC BLOOD PRESSURE: 68 MMHG | RESPIRATION RATE: 20 BRPM | TEMPERATURE: 97 F | WEIGHT: 210 LBS

## 2018-09-07 DIAGNOSIS — M25.462 EFFUSION OF LEFT KNEE: ICD-10-CM

## 2018-09-07 DIAGNOSIS — R26.89 LIMPING: ICD-10-CM

## 2018-09-07 DIAGNOSIS — M25.562 LEFT KNEE PAIN, UNSPECIFIED CHRONICITY: Primary | ICD-10-CM

## 2018-09-07 PROCEDURE — 99213 OFFICE O/P EST LOW 20 MIN: CPT | Performed by: FAMILY MEDICINE

## 2018-09-07 PROCEDURE — G8417 CALC BMI ABV UP PARAM F/U: HCPCS | Performed by: FAMILY MEDICINE

## 2018-09-07 PROCEDURE — 1036F TOBACCO NON-USER: CPT | Performed by: FAMILY MEDICINE

## 2018-09-07 PROCEDURE — G8427 DOCREV CUR MEDS BY ELIG CLIN: HCPCS | Performed by: FAMILY MEDICINE

## 2018-09-07 ASSESSMENT — ENCOUNTER SYMPTOMS
DIARRHEA: 0
CONSTIPATION: 0
SHORTNESS OF BREATH: 0
EYE DISCHARGE: 0
ABDOMINAL PAIN: 0
EYE ITCHING: 0
SINUS PRESSURE: 0
COUGH: 0
SORE THROAT: 0

## 2018-09-07 NOTE — PROGRESS NOTES
Subjective  [place] Cea, 28 y.o. male presents today with:  Chief Complaint   Patient presents with    Knee Pain     Patient in office being seen for a follow up 07/16/18, lt knee pain. He reports that he has been going to PT 2/wk for the last 1 month. Pain is still moderate to severe. He would like to discuss possibly getting a MRI. Patient in self employed           HPI    Patient here for follow-up left knee pain failing physical therapy. Assessment & Plan    Diagnosis Orders   1. Left knee pain, unspecified chronicity  MRI LOWER EXTREMITY LEFT W JT WO CONTRAST   2. Limping  MRI LOWER EXTREMITY LEFT W JT WO CONTRAST   3. Effusion of left knee  MRI LOWER EXTREMITY LEFT W JT WO CONTRAST     Orders Placed This Encounter   Procedures    MRI LOWER EXTREMITY LEFT W JT WO CONTRAST     Standing Status:   Future     Standing Expiration Date:   9/7/2019     Order Specific Question:   Laterality     Answer:   Left     No orders of the defined types were placed in this encounter. There are no discontinued medications. No Follow-up on file. Controlled Substances Monitoring:     No flowsheet data found. Tevin Womack MD              No other questions and or concerns for today's visit      Review of Systems   Constitutional: Negative for appetite change, fatigue and fever. HENT: Negative for congestion, ear pain, sinus pressure and sore throat. Eyes: Negative for discharge and itching. Respiratory: Negative for cough and shortness of breath. Cardiovascular: Negative for chest pain and palpitations. Gastrointestinal: Negative for abdominal pain, constipation and diarrhea. Endocrine: Negative for polydipsia. Genitourinary: Negative for difficulty urinating. Musculoskeletal: Positive for arthralgias, gait problem (limping) and myalgias. Skin: Negative. Neurological: Negative for dizziness. Hematological: Negative. Psychiatric/Behavioral: Negative.           Past Medical get this approved in follow-up with a negative scheduled as soon as possible. Assessment & Plan    Diagnosis Orders   1. Left knee pain, unspecified chronicity  MRI LOWER EXTREMITY LEFT W JT WO CONTRAST   2. Limping  MRI LOWER EXTREMITY LEFT W JT WO CONTRAST   3. Effusion of left knee  MRI LOWER EXTREMITY LEFT W JT WO CONTRAST     Orders Placed This Encounter   Procedures    MRI LOWER EXTREMITY LEFT W JT WO CONTRAST     Standing Status:   Future     Standing Expiration Date:   9/7/2019     Order Specific Question:   Laterality     Answer:   Left     No orders of the defined types were placed in this encounter. There are no discontinued medications. No Follow-up on file. Controlled Substances Monitoring:     No flowsheet data found.     Mariajose Kuo MD

## 2018-09-10 ENCOUNTER — HOSPITAL ENCOUNTER (OUTPATIENT)
Dept: PHYSICAL THERAPY | Age: 33
Setting detail: THERAPIES SERIES
Discharge: HOME | End: 2018-09-10
Payer: COMMERCIAL

## 2018-09-13 ENCOUNTER — HOSPITAL ENCOUNTER (OUTPATIENT)
Dept: PHYSICAL THERAPY | Age: 33
Setting detail: THERAPIES SERIES
Discharge: HOME | End: 2018-09-13
Payer: COMMERCIAL

## 2018-09-13 PROCEDURE — 97110 THERAPEUTIC EXERCISES: CPT

## 2018-09-13 PROCEDURE — G0283 ELEC STIM OTHER THAN WOUND: HCPCS

## 2018-09-13 ASSESSMENT — PAIN DESCRIPTION - DESCRIPTORS: DESCRIPTORS: CONSTANT;THROBBING

## 2018-09-13 ASSESSMENT — PAIN DESCRIPTION - PAIN TYPE: TYPE: ACUTE PAIN

## 2018-09-13 ASSESSMENT — PAIN SCALES - GENERAL: PAINLEVEL_OUTOF10: 5

## 2018-09-13 ASSESSMENT — PAIN DESCRIPTION - ORIENTATION: ORIENTATION: LEFT

## 2018-09-13 ASSESSMENT — PAIN DESCRIPTION - FREQUENCY: FREQUENCY: CONTINUOUS

## 2018-09-13 ASSESSMENT — PAIN DESCRIPTION - LOCATION: LOCATION: KNEE

## 2018-09-18 ENCOUNTER — HOSPITAL ENCOUNTER (OUTPATIENT)
Dept: MRI IMAGING | Age: 33
Discharge: HOME | End: 2018-09-20
Payer: COMMERCIAL

## 2018-09-18 DIAGNOSIS — R26.89 LIMPING: ICD-10-CM

## 2018-09-18 DIAGNOSIS — M25.562 LEFT KNEE PAIN, UNSPECIFIED CHRONICITY: ICD-10-CM

## 2018-09-18 DIAGNOSIS — M25.462 EFFUSION OF LEFT KNEE: ICD-10-CM

## 2018-09-18 PROCEDURE — 73721 MRI JNT OF LWR EXTRE W/O DYE: CPT

## 2018-09-20 ENCOUNTER — HOSPITAL ENCOUNTER (OUTPATIENT)
Dept: PHYSICAL THERAPY | Age: 33
Setting detail: THERAPIES SERIES
Discharge: HOME | End: 2018-09-20
Payer: COMMERCIAL

## 2018-10-08 ENCOUNTER — CLINICAL DOCUMENTATION (OUTPATIENT)
Dept: PHYSICAL THERAPY | Age: 33
End: 2018-10-08

## 2023-04-24 ENCOUNTER — OFFICE VISIT (OUTPATIENT)
Dept: PRIMARY CARE | Facility: CLINIC | Age: 38
End: 2023-04-24
Payer: COMMERCIAL

## 2023-04-24 VITALS
SYSTOLIC BLOOD PRESSURE: 128 MMHG | TEMPERATURE: 97.8 F | BODY MASS INDEX: 31.31 KG/M2 | DIASTOLIC BLOOD PRESSURE: 88 MMHG | WEIGHT: 212 LBS | HEART RATE: 66 BPM

## 2023-04-24 DIAGNOSIS — N63.15 MASS OVERLAPPING MULTIPLE QUADRANTS OF RIGHT BREAST: ICD-10-CM

## 2023-04-24 DIAGNOSIS — G89.29 CHRONIC LEFT SHOULDER PAIN: Primary | ICD-10-CM

## 2023-04-24 DIAGNOSIS — M25.512 CHRONIC LEFT SHOULDER PAIN: Primary | ICD-10-CM

## 2023-04-24 PROBLEM — G40.909 EPILEPSY, UNSPECIFIED, NOT INTRACTABLE, WITHOUT STATUS EPILEPTICUS (MULTI): Status: ACTIVE | Noted: 2022-09-07

## 2023-04-24 PROBLEM — D17.1 LIPOMA OF BACK: Status: ACTIVE | Noted: 2023-04-24

## 2023-04-24 PROBLEM — D17.21 BENIGN LIPOMATOUS NEOPLASM OF SKIN, SUBCU OF RIGHT ARM: Status: ACTIVE | Noted: 2022-08-31

## 2023-04-24 PROCEDURE — 1036F TOBACCO NON-USER: CPT | Performed by: FAMILY MEDICINE

## 2023-04-24 PROCEDURE — 99214 OFFICE O/P EST MOD 30 MIN: CPT | Performed by: FAMILY MEDICINE

## 2023-04-24 RX ORDER — ASCORBIC ACID 500 MG
TABLET,CHEWABLE ORAL
COMMUNITY

## 2023-04-24 RX ORDER — PREDNISONE 20 MG/1
TABLET ORAL
Qty: 21 TABLET | Refills: 0 | Status: SHIPPED | OUTPATIENT
Start: 2023-04-24 | End: 2023-05-08

## 2023-04-24 RX ORDER — CYCLOBENZAPRINE HCL 5 MG
5 TABLET ORAL NIGHTLY
Qty: 14 TABLET | Refills: 1 | Status: SHIPPED | OUTPATIENT
Start: 2023-04-24 | End: 2023-05-08
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
## 2023-05-04 ENCOUNTER — TELEPHONE (OUTPATIENT)
Dept: PRIMARY CARE | Facility: CLINIC | Age: 38
End: 2023-05-04
Payer: COMMERCIAL

## 2023-05-04 NOTE — TELEPHONE ENCOUNTER
Results given to patient in detail//yv  He will think about it and if he wants removed he will call back for referral//yv      ----- Message from Janet Goodman CMA sent at 5/2/2023 10:40 AM EDT -----  Voicemail full//yv    ----- Message -----  From: Thalia Augustin DO  Sent: 5/1/2023   4:27 PM EDT  To: Janet Goodman CMA    Breast lump appears to be a lipoma, not a breast cancer. Lmk if hed like a general surgery referral to have the lipoma removed  Thanks,  Thalia Augustin    ----- Message -----  From: Felice Stellaris - Radiology Results In  Sent: 5/1/2023   4:13 PM EDT  To: Thalia Augustin DO

## 2024-06-03 ENCOUNTER — OFFICE VISIT (OUTPATIENT)
Dept: PRIMARY CARE | Facility: CLINIC | Age: 39
End: 2024-06-03
Payer: COMMERCIAL

## 2024-06-03 VITALS
RESPIRATION RATE: 18 BRPM | SYSTOLIC BLOOD PRESSURE: 118 MMHG | WEIGHT: 218 LBS | OXYGEN SATURATION: 98 % | TEMPERATURE: 97.3 F | HEART RATE: 87 BPM | DIASTOLIC BLOOD PRESSURE: 72 MMHG | HEIGHT: 69 IN | BODY MASS INDEX: 32.29 KG/M2

## 2024-06-03 DIAGNOSIS — J32.9 CHRONIC RECURRENT SINUSITIS: ICD-10-CM

## 2024-06-03 DIAGNOSIS — J32.9 CHRONIC RECURRENT SINUSITIS: Primary | ICD-10-CM

## 2024-06-03 DIAGNOSIS — F41.9 ANXIETY: ICD-10-CM

## 2024-06-03 DIAGNOSIS — R56.9 SEIZURE (MULTI): ICD-10-CM

## 2024-06-03 PROCEDURE — 99204 OFFICE O/P NEW MOD 45 MIN: CPT | Performed by: NURSE PRACTITIONER

## 2024-06-03 PROCEDURE — 1036F TOBACCO NON-USER: CPT | Performed by: NURSE PRACTITIONER

## 2024-06-03 RX ORDER — AZITHROMYCIN 250 MG/1
250 TABLET, FILM COATED ORAL
COMMUNITY

## 2024-06-03 RX ORDER — SERTRALINE HYDROCHLORIDE 50 MG/1
50 TABLET, FILM COATED ORAL DAILY
Qty: 30 TABLET | Refills: 5 | Status: SHIPPED | OUTPATIENT
Start: 2024-06-03 | End: 2024-11-30

## 2024-06-03 RX ORDER — DOXYCYCLINE 100 MG/1
100 CAPSULE ORAL 2 TIMES DAILY
Qty: 14 CAPSULE | Refills: 0 | Status: SHIPPED | OUTPATIENT
Start: 2024-06-03 | End: 2024-06-04

## 2024-06-03 RX ORDER — ACETAMINOPHEN AND CODEINE PHOSPHATE 300; 30 MG/1; MG/1
1 TABLET ORAL EVERY 6 HOURS PRN
COMMUNITY
Start: 2022-05-09

## 2024-06-03 RX ORDER — TRIAMCINOLONE ACETONIDE 55 UG/1
2 SPRAY, METERED NASAL DAILY
Start: 2024-06-03 | End: 2025-06-03

## 2024-06-03 RX ORDER — IBUPROFEN 200 MG
200 TABLET ORAL EVERY 6 HOURS PRN
COMMUNITY

## 2024-06-03 RX ORDER — OFLOXACIN 3 MG/ML
10 SOLUTION AURICULAR (OTIC)
COMMUNITY
Start: 2023-08-16

## 2024-06-03 RX ORDER — CETIRIZINE HYDROCHLORIDE 10 MG/1
10 TABLET ORAL DAILY
Start: 2024-06-03 | End: 2025-06-03

## 2024-06-03 ASSESSMENT — ENCOUNTER SYMPTOMS
HEADACHES: 1
SWOLLEN GLANDS: 0
SINUS PRESSURE: 1
SORE THROAT: 0
COUGH: 0

## 2024-06-03 NOTE — ASSESSMENT & PLAN NOTE
Episodes are getting more frequent and pt. Gets angry outbursts that are disproportionate to the stress. Has a hard time falling asleep, but able stay asleep once he does. He does have chest tightness and SOB with his anxiety, but no chest pain. He also has intermittent night terrors that started in childhood. Pt's daughter (12 y.o.) also has anxiety and takes Zoloft and clonidine, which are effective for her.   Will try sertraline 50 mg daily.
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
## 2024-06-04 RX ORDER — DOXYCYCLINE 100 MG/1
100 CAPSULE ORAL 2 TIMES DAILY
Qty: 20 CAPSULE | Refills: 0 | Status: SHIPPED | OUTPATIENT
Start: 2024-06-04 | End: 2024-06-14

## 2024-06-11 ENCOUNTER — OFFICE VISIT (OUTPATIENT)
Dept: OTOLARYNGOLOGY | Facility: CLINIC | Age: 39
End: 2024-06-11
Payer: COMMERCIAL

## 2024-06-11 VITALS — HEIGHT: 69 IN | BODY MASS INDEX: 32.29 KG/M2 | WEIGHT: 218 LBS

## 2024-06-11 DIAGNOSIS — H60.60 CHRONIC OTITIS EXTERNA, UNSPECIFIED LATERALITY, UNSPECIFIED TYPE: ICD-10-CM

## 2024-06-11 DIAGNOSIS — J32.9 CHRONIC SINUSITIS, UNSPECIFIED LOCATION: Primary | ICD-10-CM

## 2024-06-11 DIAGNOSIS — G50.1 ATYPICAL FACE PAIN: ICD-10-CM

## 2024-06-11 PROCEDURE — 1036F TOBACCO NON-USER: CPT | Performed by: PHYSICIAN ASSISTANT

## 2024-06-11 PROCEDURE — 99203 OFFICE O/P NEW LOW 30 MIN: CPT | Performed by: PHYSICIAN ASSISTANT

## 2024-06-11 RX ORDER — MOMETASONE FUROATE 1 MG/G
CREAM TOPICAL 2 TIMES DAILY
Qty: 15 G | Refills: 2 | Status: SHIPPED | OUTPATIENT
Start: 2024-06-11 | End: 2024-06-18

## 2024-06-11 ASSESSMENT — ENCOUNTER SYMPTOMS
SORE THROAT: 0
DIARRHEA: 0
ABDOMINAL PAIN: 0
COUGH: 0
VOMITING: 0
NECK PAIN: 0
RHINORRHEA: 0
HEADACHES: 0

## 2024-06-11 NOTE — PROGRESS NOTES
Dov Mackey is a 38 y.o. year old male patient with Recurrent Sinusitis     The patient presents to the office today for assessment of his sinuses.  Patient here today reporting chronic recurrent infection.  States that he has at least 4 or more sinus infections each year for the last several years.  He attempted to manage his symptoms with decongestions and antihistamines and has utilize nasal steroids in the past with only minimal relief of symptoms.  He complains of frequent facial pain and headaches in the frontal and maxillary regions.  The patient also complains of itching in the ears.  He is without other ENT related concerns at this time.      Review of Systems   HENT:  Positive for ear discharge and ear pain. Negative for hearing loss, rhinorrhea and sore throat.    Respiratory:  Negative for cough.    Gastrointestinal:  Negative for abdominal pain, diarrhea and vomiting.   Musculoskeletal:  Negative for neck pain.   Skin:  Negative for rash.   Neurological:  Negative for headaches.   All other systems reviewed and are negative.        Physical Exam:   General appearance: No acute distress. Normal facies. Symmetric facial movement. No gross lesions of the face are noted.  The external ear structures appear normal. The ear canals patent and the tympanic membranes are intact without evidence of air-fluid levels, retraction, or congenital defects.  Anterior rhinoscopy notes essentially a midline nasal septum. Examination is noted for normal healthy mucosal membranes without any evidence of lesions, polyps, or exudate. The tongue is normally mobile. There are no lesions on the gingiva, buccal, or oral mucosa. There are no oral cavity masses.  The neck is negative for mass lymphadenopathy. The trachea and parotid are clear. The thyroid bed is grossly unremarkable. The salivary gland structures are grossly unremarkable.        Assessment/Plan   1.  Chronic otitis externa  2.  Chronic sinusitis  3.  Atypical  facial pain    Patient seen in the office today for assessment of ears as well as sinuses.  Patient with chronic otitis externa with likely eczema component and will be started on Elocon.  Additionally the patient has chronic sinusitis with facial pain.  He is going to be set up for dedicated CT imaging of the sinuses as well as immune blood work.  We will see him back following to review.      Answers submitted by the patient for this visit:  Ear Pain Questionnaire (Submitted on 6/11/2024)  Chief Complaint: Ear pain  Affected ear: right  Chronicity: chronic  Onset: more than 1 month ago  Progression since onset: waxing and waning  Frequency: constantly  Fever: no fever  Pain - numeric: 7/10

## 2024-06-13 ENCOUNTER — HOSPITAL ENCOUNTER (OUTPATIENT)
Dept: RADIOLOGY | Facility: CLINIC | Age: 39
Discharge: HOME | End: 2024-06-13
Payer: COMMERCIAL

## 2024-06-13 ENCOUNTER — LAB (OUTPATIENT)
Dept: LAB | Facility: LAB | Age: 39
End: 2024-06-13
Payer: COMMERCIAL

## 2024-06-13 DIAGNOSIS — J32.9 CHRONIC SINUSITIS, UNSPECIFIED LOCATION: ICD-10-CM

## 2024-06-13 DIAGNOSIS — J32.9 CHRONIC RECURRENT SINUSITIS: ICD-10-CM

## 2024-06-13 DIAGNOSIS — G50.1 ATYPICAL FACE PAIN: ICD-10-CM

## 2024-06-13 LAB
ALBUMIN SERPL BCP-MCNC: 4.7 G/DL (ref 3.4–5)
ALP SERPL-CCNC: 57 U/L (ref 33–120)
ALT SERPL W P-5'-P-CCNC: 21 U/L (ref 10–52)
ANION GAP SERPL CALC-SCNC: 13 MMOL/L (ref 10–20)
AST SERPL W P-5'-P-CCNC: 19 U/L (ref 9–39)
BILIRUB SERPL-MCNC: 0.5 MG/DL (ref 0–1.2)
BUN SERPL-MCNC: 20 MG/DL (ref 6–23)
CALCIUM SERPL-MCNC: 9.8 MG/DL (ref 8.6–10.3)
CHLORIDE SERPL-SCNC: 104 MMOL/L (ref 98–107)
CO2 SERPL-SCNC: 28 MMOL/L (ref 21–32)
CREAT SERPL-MCNC: 1.16 MG/DL (ref 0.5–1.3)
EGFRCR SERPLBLD CKD-EPI 2021: 82 ML/MIN/1.73M*2
ERYTHROCYTE [DISTWIDTH] IN BLOOD BY AUTOMATED COUNT: 12.4 % (ref 11.5–14.5)
GLUCOSE SERPL-MCNC: 129 MG/DL (ref 74–99)
HCT VFR BLD AUTO: 44.4 % (ref 41–52)
HGB BLD-MCNC: 15.2 G/DL (ref 13.5–17.5)
MCH RBC QN AUTO: 30.9 PG (ref 26–34)
MCHC RBC AUTO-ENTMCNC: 34.2 G/DL (ref 32–36)
MCV RBC AUTO: 90 FL (ref 80–100)
NRBC BLD-RTO: 0 /100 WBCS (ref 0–0)
PLATELET # BLD AUTO: 317 X10*3/UL (ref 150–450)
POTASSIUM SERPL-SCNC: 4.3 MMOL/L (ref 3.5–5.3)
PROT SERPL-MCNC: 7.4 G/DL (ref 6.4–8.2)
RBC # BLD AUTO: 4.92 X10*6/UL (ref 4.5–5.9)
SODIUM SERPL-SCNC: 141 MMOL/L (ref 136–145)
WBC # BLD AUTO: 7.1 X10*3/UL (ref 4.4–11.3)

## 2024-06-13 PROCEDURE — 82784 ASSAY IGA/IGD/IGG/IGM EACH: CPT

## 2024-06-13 PROCEDURE — 85027 COMPLETE CBC AUTOMATED: CPT

## 2024-06-13 PROCEDURE — 36415 COLL VENOUS BLD VENIPUNCTURE: CPT

## 2024-06-13 PROCEDURE — 86317 IMMUNOASSAY INFECTIOUS AGENT: CPT

## 2024-06-13 PROCEDURE — 70486 CT MAXILLOFACIAL W/O DYE: CPT

## 2024-06-13 PROCEDURE — 70486 CT MAXILLOFACIAL W/O DYE: CPT | Performed by: RADIOLOGY

## 2024-06-13 PROCEDURE — 80053 COMPREHEN METABOLIC PANEL: CPT

## 2024-06-14 LAB
IGG SERPL-MCNC: 1060 MG/DL (ref 700–1600)
IGG1 SER-MCNC: 667 MG/DL (ref 490–1140)
IGG2 SER-MCNC: 354 MG/DL (ref 150–640)
IGG3 SER-MCNC: 26 MG/DL (ref 11–85)
IGG4 SER-MCNC: 53 MG/DL (ref 3–200)

## 2024-06-17 LAB — HAEM INFLU B IGG SER-MCNC: 0.1 UG/ML

## 2024-06-19 LAB
S PN DA SERO 19F IGG SER-MCNC: 11.69 UG/ML
S PNEUM DA 1 IGG SER-MCNC: 0.44 UG/ML
S PNEUM DA 10A IGG SER-MCNC: 1.19 UG/ML
S PNEUM DA 11A IGG SER-MCNC: 0.05 UG/ML
S PNEUM DA 12F IGG SER-MCNC: 0.63 UG/ML
S PNEUM DA 14 IGG SER-MCNC: 0.2 UG/ML
S PNEUM DA 15B IGG SER-MCNC: 0.14 UG/ML
S PNEUM DA 17F IGG SER-MCNC: 0.04 UG/ML
S PNEUM DA 18C IGG SER-MCNC: 4.52 UG/ML
S PNEUM DA 19A IGG SER-MCNC: 2.03 UG/ML
S PNEUM DA 2 IGG SER-MCNC: 1.26 UG/ML
S PNEUM DA 20A IGG SER-MCNC: 0.23 UG/ML
S PNEUM DA 22F IGG SER-MCNC: 0.3 UG/ML
S PNEUM DA 23F IGG SER-MCNC: 0.25 UG/ML
S PNEUM DA 3 IGG SER-MCNC: 1.07 UG/ML
S PNEUM DA 33F IGG SER-MCNC: 0.17 UG/ML
S PNEUM DA 4 IGG SER-MCNC: 0.22 UG/ML
S PNEUM DA 5 IGG SER-MCNC: 0.45 UG/ML
S PNEUM DA 6B IGG SER-MCNC: 0.12 UG/ML
S PNEUM DA 7F IGG SER-MCNC: 0.31 UG/ML
S PNEUM DA 8 IGG SER-MCNC: 0.74 UG/ML
S PNEUM DA 9N IGG SER-MCNC: 0.06 UG/ML
S PNEUM DA 9V IGG SER-MCNC: 0.11 UG/ML
S PNEUM SEROTYPE IGG SER-IMP: NORMAL

## 2024-06-25 ENCOUNTER — TELEPHONE (OUTPATIENT)
Dept: PRIMARY CARE | Facility: CLINIC | Age: 39
End: 2024-06-25
Payer: COMMERCIAL

## 2024-06-25 DIAGNOSIS — R73.9 BLOOD GLUCOSE ELEVATED: Primary | ICD-10-CM

## 2024-06-25 NOTE — TELEPHONE ENCOUNTER
-Message from LOLY Escobar-CNP at 6/23/2024 10:22 PM EDT-  Please advise patient to schedule a follow-up appointment with ENT (Zain Dominguez PA-C) to review results of his CT of sinuses.

## 2024-08-12 ENCOUNTER — LAB (OUTPATIENT)
Dept: LAB | Facility: LAB | Age: 39
End: 2024-08-12
Payer: COMMERCIAL

## 2024-08-12 ENCOUNTER — APPOINTMENT (OUTPATIENT)
Dept: OTOLARYNGOLOGY | Facility: CLINIC | Age: 39
End: 2024-08-12
Payer: COMMERCIAL

## 2024-08-12 DIAGNOSIS — D80.6 ANTI-PNEUMOCOCCAL POLYSACCHARIDE ANTIBODY DEFICIENCY (MULTI): Primary | ICD-10-CM

## 2024-08-12 DIAGNOSIS — D80.6 ANTI-PNEUMOCOCCAL POLYSACCHARIDE ANTIBODY DEFICIENCY (MULTI): ICD-10-CM

## 2024-08-12 DIAGNOSIS — R73.9 BLOOD GLUCOSE ELEVATED: ICD-10-CM

## 2024-08-12 PROCEDURE — 86317 IMMUNOASSAY INFECTIOUS AGENT: CPT

## 2024-08-12 PROCEDURE — 83036 HEMOGLOBIN GLYCOSYLATED A1C: CPT

## 2024-08-12 PROCEDURE — 36415 COLL VENOUS BLD VENIPUNCTURE: CPT

## 2024-08-12 PROCEDURE — 99212 OFFICE O/P EST SF 10 MIN: CPT | Performed by: PHYSICIAN ASSISTANT

## 2024-08-12 NOTE — PROGRESS NOTES
Dov Mackey is a 39 y.o. year old male patient with FU ON SINUSES     Patient presents to the office for follow-up on sinuses.  Patient did have previous sinus CT which demonstrated minimal mucosal thickening but no new blood work showed significant deficiency again strep pneumococcus.  The patient did receive the Prevnar vaccine but has not yet had repeat labs.  The patient otherwise reports that he is doing well without concern.  There have been no significant changes in past medical or past surgical histories except as mentioned.      Review of Systems   All other systems reviewed and are negative.        Physical Exam:   General appearance: No acute distress. Normal facies. Symmetric facial movement. No gross lesions of the face are noted.  The external ear structures appear normal. The ear canals patent and the tympanic membranes are intact without evidence of air-fluid levels, retraction, or congenital defects.  Anterior rhinoscopy notes essentially a midline nasal septum. Examination is noted for normal healthy mucosal membranes without any evidence of lesions, polyps, or exudate. The tongue is normally mobile. There are no lesions on the gingiva, buccal, or oral mucosa. There are no oral cavity masses.  The neck is negative for mass lymphadenopathy. The trachea and parotid are clear. The thyroid bed is grossly unremarkable. The salivary gland structures are grossly unremarkable.    Assessment/Plan   1.  Chronic sinusitis    Patient with history of chronic sinusitis with immunodeficiency on immune blood work.  At this time the patient is going to be set up for repeat labs and we will review and contact the patient with the findings.

## 2024-08-13 LAB
EST. AVERAGE GLUCOSE BLD GHB EST-MCNC: 100 MG/DL
HBA1C MFR BLD: 5.1 %

## 2024-08-15 LAB
S PN DA SERO 19F IGG SER-MCNC: 12.41 UG/ML
S PNEUM DA 1 IGG SER-MCNC: 2.26 UG/ML
S PNEUM DA 10A IGG SER-MCNC: 13.66 UG/ML
S PNEUM DA 11A IGG SER-MCNC: >3.45 UG/ML
S PNEUM DA 12F IGG SER-MCNC: 2.51 UG/ML
S PNEUM DA 14 IGG SER-MCNC: 1.49 UG/ML
S PNEUM DA 15B IGG SER-MCNC: 10.86 UG/ML
S PNEUM DA 17F IGG SER-MCNC: 0.27 UG/ML
S PNEUM DA 18C IGG SER-MCNC: 8.3 UG/ML
S PNEUM DA 19A IGG SER-MCNC: 2.35 UG/ML
S PNEUM DA 2 IGG SER-MCNC: 1.14 UG/ML
S PNEUM DA 20A IGG SER-MCNC: 0.42 UG/ML
S PNEUM DA 22F IGG SER-MCNC: 1.66 UG/ML
S PNEUM DA 23F IGG SER-MCNC: >9.46 UG/ML
S PNEUM DA 3 IGG SER-MCNC: 1.53 UG/ML
S PNEUM DA 33F IGG SER-MCNC: 0.55 UG/ML
S PNEUM DA 4 IGG SER-MCNC: 3.9 UG/ML
S PNEUM DA 5 IGG SER-MCNC: >21.21 UG/ML
S PNEUM DA 6B IGG SER-MCNC: 13.51 UG/ML
S PNEUM DA 7F IGG SER-MCNC: 2.65 UG/ML
S PNEUM DA 8 IGG SER-MCNC: 0.9 UG/ML
S PNEUM DA 9N IGG SER-MCNC: 0.74 UG/ML
S PNEUM DA 9V IGG SER-MCNC: 4.76 UG/ML
S PNEUM SEROTYPE IGG SER-IMP: NORMAL